# Patient Record
Sex: MALE | NOT HISPANIC OR LATINO | URBAN - METROPOLITAN AREA
[De-identification: names, ages, dates, MRNs, and addresses within clinical notes are randomized per-mention and may not be internally consistent; named-entity substitution may affect disease eponyms.]

---

## 2021-08-23 ENCOUNTER — HOSPITAL ENCOUNTER (EMERGENCY)
Facility: CLINIC | Age: 36
Discharge: HOME | End: 2021-08-23
Attending: EMERGENCY MEDICINE | Admitting: EMERGENCY MEDICINE

## 2021-08-23 VITALS
OXYGEN SATURATION: 99 % | DIASTOLIC BLOOD PRESSURE: 91 MMHG | HEIGHT: 60 IN | BODY MASS INDEX: 29.45 KG/M2 | HEART RATE: 80 BPM | WEIGHT: 150 LBS | RESPIRATION RATE: 16 BRPM | SYSTOLIC BLOOD PRESSURE: 126 MMHG | TEMPERATURE: 98.6 F

## 2021-08-23 DIAGNOSIS — F10.121 ALCOHOL INTOXICATION DELIRIUM (H): ICD-10-CM

## 2021-08-23 DIAGNOSIS — R41.82 ALTERED MENTAL STATUS, UNSPECIFIED ALTERED MENTAL STATUS TYPE: ICD-10-CM

## 2021-08-23 PROCEDURE — 99283 EMERGENCY DEPT VISIT LOW MDM: CPT | Performed by: EMERGENCY MEDICINE

## 2021-08-23 PROCEDURE — 99282 EMERGENCY DEPT VISIT SF MDM: CPT | Performed by: EMERGENCY MEDICINE

## 2021-08-23 ASSESSMENT — MIFFLIN-ST. JEOR: SCORE: 1017.9

## 2021-08-23 NOTE — ED PROVIDER NOTES
"ED Provider Note  [facility]      History     Chief Complaint   Patient presents with     Alcohol Intoxication     HPI  Samantha Rogers is a patient brought to the emergency department by [facility] police.  He was found outside intoxicated and blew a 0.35.  There are no reports of trauma.  Patient reports that he has been drinking a few shots today and has no complaints.  He states he wants to go home but is unable to find a sober  to take him home.    Past Medical History  History reviewed. No pertinent past medical history.  History reviewed. No pertinent surgical history.  No current outpatient medications on file.    No Known Allergies  Past medical history, past surgical history, medications, and allergies were reviewed with the patient. Additional pertinent items: None    Family History  History reviewed. No pertinent family history.  Family history was reviewed with the patient. Additional pertinent items: None    Social History  Social History     Tobacco Use     Smoking status: Never Smoker     Smokeless tobacco: Never Used   Substance Use Topics     Alcohol use: Yes     Drug use: Not Currently      Social history was reviewed with the patient. Additional pertinent items: None      Review of Systems  A complete review of systems was performed with pertinent positives and negatives noted in the HPI, and all other systems negative.    Physical Exam   BP: (!) 126/91  Pulse: 84  Temp: 98.6  F (37  C)  Resp: 14  Height: 150 cm (4' 11.06\")  Weight: 68 kg (150 lb)  SpO2: 99 %  Physical Exam  Vitals and nursing note reviewed.   Constitutional:       General: He is not in acute distress.     Appearance: He is well-developed. He is not ill-appearing or toxic-appearing.      Comments: Patient is awake and alert, gait is ataxic.  Speech is clear, he is maintaining an airway without difficulty.  No obvious signs of trauma noted.   HENT:      Head: Normocephalic and " atraumatic.   Eyes:      General: No scleral icterus.     Pupils: Pupils are equal, round, and reactive to light.   Cardiovascular:      Rate and Rhythm: Normal rate.   Pulmonary:      Effort: Pulmonary effort is normal. No respiratory distress.   Musculoskeletal:      Cervical back: Normal range of motion and neck supple.   Skin:     General: Skin is warm and dry.      Coloration: Skin is not pale.      Findings: No rash.   Neurological:      Mental Status: He is alert and oriented to person, place, and time.      Sensory: No sensory deficit.   Psychiatric:         Behavior: Behavior normal.         ED Course      Procedures          Assessments & Plan (with Medical Decision Making)     This patient presented to the emergency department intoxicated.  He is maintaining an airway without difficulty.  No signs of trauma noted.  Plan will be to observe the patient to clinical sobriety as he does not have a sober  to come pick him up and then discharge as patient does not want to be in the emergency department.    I have reviewed the nursing notes. I have reviewed the findings, diagnosis, plan and need for follow up with the patient.    There are no discharge medications for this patient.      Final diagnoses:   Altered mental status, unspecified altered mental status type   Alcohol intoxication delirium (H)       --  Jose D Dyer  [facility] EMERGENCY DEPARTMENT  8/23/2021     Jose D Dyer MD  08/25/21 1362

## 2021-08-23 NOTE — ED TRIAGE NOTES
Patient brought into ER by [employer]. Patient was found outside hospital wondering around. Breathalyzer for [employer] 0.35. Was waiting for sober ride with  but none came. Patient is spanishing speaking.

## 2021-08-23 NOTE — DISCHARGE INSTRUCTIONS
Please use the below resources and your primary care physician to safely cease alcohol and/or substance use.     Return to the ED if you are having any urgent/life-threatening concerns.     DISCHARGE RESOURCES:  -[facility]: [phone] (detox), [phone] (outpatient & [facility])  -[facility] ([address]): [phone]  -[facility] Detox: [phone]  -[facility] [facility]: [phone]  -SMART Recovery - self management for addiction recovery:  www.smartrecovery.org    -Path[facility] ~ A Health Crisis Resource & Support Center: 678.871.2513.  -[facility] [phone]   -Substance Abuse and Mental Health Services (www.samhsa.gov)  -Harm Reduction Coalition (www. Harmreduction.org)  -[place] Opioid Prevention Coalition: www.opioidcoalition.org  -Poison control 1-708.964.2507       Sober Support Group Information:  AA/NA & Sponsor/Support  -Alcoholics Anonymous (www.alcoholics-anonymous.org): for local information 24 hours/day  -AA Intergroup service office in [place] (http://www.aastpaul.org/) [phone]  -AA Intergroup service office in [facility]: [phone]. (http://www.[place].org/)  -Narcotics Anonymous (www.nam[place].org) (442) [phone]   -Sober Fun Activities: www.sober-activities.db4objects.VoxFeed/[facility]//[facility] Recovery Connection ([facility])  [facility] connects people seeking recovery to resources that help foster and sustain long-term recovery.  Whether you are seeking resources for treatment, transportation, housing, job training, education, health care or other pathways to recovery, [facility] is a great place to start.   Phone: [phone]. www.[place]Heatmaps.[place] (Great listing of all types of recovery and non-recovery related resources)

## 2021-08-23 NOTE — ED NOTES
"     Emergency Department Patient Sign-out       Brief HPI and ED course:  Patient is a 89 year old male signed out to me by Dr. Dyer.  See initial ED Provider note for details of the presentation. In brief, patient found altered on campus. Breath EtOH 0.35 by [place] police.     Vitals:   Patient Vitals for the past 24 hrs:   BP Temp Temp src Pulse Resp SpO2 Height Weight   08/23/21 0016 -- -- -- -- -- -- 1.5 m (4' 11.06\") 68 kg (150 lb)   08/23/21 0015 (!) 126/91 98.6  F (37  C) Oral 84 14 99 % -- --       Received Sign-out Plan:    Pending studies include: none       Plan:   -monitor for mental status clearing, likely discharge when sober    Events after assuming care:  After care was assumed, a focused history and physical was performed. Agree with findings relayed by previous provider.     Patient arriving with altered mental status, with reason to suspect alcohol or other drug intoxication as etiology. Exam without findings suggestive of trauma, non-focal. Breath EtOH 0.35 with police prior to arrival. Nursing notes reviewed.     AMS likely due to intoxication delirium but cannot immediately rule out other dangerous etiologies of AMS. Plan close clinical monitoring of the patient and his mental status for clearing of intoxicating substance. With approriate clearing, the patient would likely be able to be discharged. If not appropriately clearing, plan broadening of work-up, potentially including CT head and serum labs.     During my care, the patient did not require medications for agitation, and did not require restraints/seclusion for patient and/or provider safety.     With monitoring, patient's mental status cleared. Patient then clinically sober with steady gait and tolerating PO. Normalization of mental status with sobering makes other causes of altered mental status very unlikely. After counseling on the diagnosis, work-up, and treatment plan, the patient was discharged. Recommended safe " cessation of intoxicating substances and provided information on community treatment resources. Patient to follow-up with primary care in the coming days for recheck and further cessation counseling. Patient to return to the ED if any urgent/life-threatening concerns.     Final diagnoses:   Altered mental status, unspecified altered mental status type   Alcohol intoxication delirium (H)     New Prescriptions    No medications on file       --  Chace Broderick MD   Emergency Medicine   formerly [facility] EMERGENCY DEPARTMENT  8/23/2021         Chace Broderick MD  08/23/21 0557

## 2022-04-28 ENCOUNTER — HOSPITAL ENCOUNTER (OUTPATIENT)
Facility: CLINIC | Age: 37
Setting detail: OBSERVATION
Discharge: HOME | End: 2022-04-29
Attending: EMERGENCY MEDICINE | Admitting: EMERGENCY MEDICINE

## 2022-04-28 DIAGNOSIS — F10.121 ALCOHOL ABUSE WITH INTOXICATION DELIRIUM (H): ICD-10-CM

## 2022-04-28 DIAGNOSIS — R41.82 ALTERED MENTAL STATUS, UNSPECIFIED ALTERED MENTAL STATUS TYPE: ICD-10-CM

## 2022-04-28 PROCEDURE — 99284 EMERGENCY DEPT VISIT MOD MDM: CPT | Mod: 25 | Performed by: EMERGENCY MEDICINE

## 2022-04-28 PROCEDURE — 82962 GLUCOSE BLOOD TEST: CPT

## 2022-04-28 PROCEDURE — 99219 PR INITIAL OBSERVATION CARE,LEVEL II: CPT | Performed by: EMERGENCY MEDICINE

## 2022-04-29 VITALS
RESPIRATION RATE: 16 BRPM | HEART RATE: 90 BPM | OXYGEN SATURATION: 99 % | TEMPERATURE: 97.6 F | DIASTOLIC BLOOD PRESSURE: 64 MMHG | SYSTOLIC BLOOD PRESSURE: 100 MMHG

## 2022-04-29 LAB
ALBUMIN SERPL-MCNC: 4.4 G/DL (ref 3.4–5)
ALP SERPL-CCNC: 85 U/L (ref 40–150)
ALT SERPL W P-5'-P-CCNC: 56 U/L (ref 0–70)
ANION GAP SERPL CALCULATED.3IONS-SCNC: 7 MMOL/L (ref 3–14)
AST SERPL W P-5'-P-CCNC: 43 U/L (ref 0–45)
BASOPHILS # BLD AUTO: 0.1 10E3/UL (ref 0–0.2)
BASOPHILS NFR BLD AUTO: 1 %
BILIRUB SERPL-MCNC: 0.3 MG/DL (ref 0.2–1.3)
BUN SERPL-MCNC: 7 MG/DL (ref 7–30)
CALCIUM SERPL-MCNC: 8.9 MG/DL (ref 8.5–10.1)
CHLORIDE BLD-SCNC: 114 MMOL/L (ref 94–109)
CO2 SERPL-SCNC: 25 MMOL/L (ref 20–32)
CREAT SERPL-MCNC: 0.65 MG/DL (ref 0.66–1.25)
EOSINOPHIL # BLD AUTO: 0.2 10E3/UL (ref 0–0.7)
EOSINOPHIL NFR BLD AUTO: 3 %
ERYTHROCYTE [DISTWIDTH] IN BLOOD BY AUTOMATED COUNT: 13.1 % (ref 10–15)
ETHANOL SERPL-MCNC: 0.48 G/DL
GFR SERPL CREATININE-BSD FRML MDRD: 73 ML/MIN/1.73M2
GLUCOSE BLD-MCNC: 95 MG/DL (ref 70–99)
GLUCOSE BLDC GLUCOMTR-MCNC: 93 MG/DL (ref 70–99)
HCT VFR BLD AUTO: 47.1 % (ref 40–53)
HGB BLD-MCNC: 15.6 G/DL (ref 13.3–17.7)
IMM GRANULOCYTES # BLD: 0 10E3/UL
IMM GRANULOCYTES NFR BLD: 0 %
LYMPHOCYTES # BLD AUTO: 3.7 10E3/UL (ref 0.8–5.3)
LYMPHOCYTES NFR BLD AUTO: 41 %
MAGNESIUM SERPL-MCNC: 2.7 MG/DL (ref 1.6–2.3)
MCH RBC QN AUTO: 31.3 PG (ref 26.5–33)
MCHC RBC AUTO-ENTMCNC: 33.1 G/DL (ref 31.5–36.5)
MCV RBC AUTO: 95 FL (ref 78–100)
MONOCYTES # BLD AUTO: 0.3 10E3/UL (ref 0–1.3)
MONOCYTES NFR BLD AUTO: 4 %
NEUTROPHILS # BLD AUTO: 4.7 10E3/UL (ref 1.6–8.3)
NEUTROPHILS NFR BLD AUTO: 51 %
NRBC # BLD AUTO: 0 10E3/UL
NRBC BLD AUTO-RTO: 0 /100
PLATELET # BLD AUTO: 308 10E3/UL (ref 150–450)
POTASSIUM BLD-SCNC: 3.8 MMOL/L (ref 3.4–5.3)
PROT SERPL-MCNC: 8.7 G/DL (ref 6.8–8.8)
RBC # BLD AUTO: 4.98 10E6/UL (ref 4.4–5.9)
SODIUM SERPL-SCNC: 146 MMOL/L (ref 133–144)
WBC # BLD AUTO: 9 10E3/UL (ref 4–11)

## 2022-04-29 PROCEDURE — 83735 ASSAY OF MAGNESIUM: CPT | Performed by: EMERGENCY MEDICINE

## 2022-04-29 PROCEDURE — 80053 COMPREHEN METABOLIC PANEL: CPT | Performed by: EMERGENCY MEDICINE

## 2022-04-29 PROCEDURE — 36415 COLL VENOUS BLD VENIPUNCTURE: CPT | Performed by: EMERGENCY MEDICINE

## 2022-04-29 PROCEDURE — 85025 COMPLETE CBC W/AUTO DIFF WBC: CPT | Performed by: EMERGENCY MEDICINE

## 2022-04-29 PROCEDURE — G0378 HOSPITAL OBSERVATION PER HR: HCPCS

## 2022-04-29 PROCEDURE — 99217 PR OBSERVATION CARE DISCHARGE: CPT | Performed by: EMERGENCY MEDICINE

## 2022-04-29 PROCEDURE — 258N000003 HC RX IP 258 OP 636: Performed by: EMERGENCY MEDICINE

## 2022-04-29 PROCEDURE — 82077 ASSAY SPEC XCP UR&BREATH IA: CPT | Performed by: EMERGENCY MEDICINE

## 2022-04-29 RX ADMIN — SODIUM CHLORIDE 1000 ML: 9 INJECTION, SOLUTION INTRAVENOUS at 00:18

## 2022-04-29 NOTE — ED NOTES
Unable to finish triage, pt somnolent. Opening eyes briefly and moving head at times with repeated stimulations.

## 2022-04-29 NOTE — ED PROVIDER NOTES
ED Observation History and Physical  [facility]  Observation Initiation Date: Apr 28, 2022    Samantha Ross MRN: [id number]   Age: 89 year old YOB: 1899     History     Chief Complaint   Patient presents with     Alcohol Intoxication     Bystander called, EMS picked him from [place] and [address] [place] [address] walk.     HPI  Samantha Ross is a 89 year old male with unknown PMH  who presented to the ED with altered mental status. History limited due to altered mental status. There is reason to suspect alcohol and/or drug intoxication as the etiology of altered mental status.  Patient found down on the sidewalk at [place] and [address] [place] [address].  Bystander called EMS.    Past medical history unable to be obtained due to altered mental status.      Review of Systems  A complete review of systems was attempted but limited due to altered mental status.    Physical Exam   BP: 105/61  Pulse: 83  Temp: (!) 95.1  F (35.1  C)  Resp: 16  SpO2: 99 %    Physical Exam  General: Patient smells of EtOH, no acute distress  HENT: MMM. Atraumatic head.   Eyes: PERRL, normal sclerae, nystagmus present  Neck: non-tender, supple  Cardio: Regular rate. Regular rhythm.   Resp: Normal work of breathing, normal respiratory rate  Abdomen: no tenderness, non-distended, no rebound, no guarding  Neuro: alert, slow to respond.  Opens eyes briefly.  Does not verbalize.. Confused. CN II-XII grossly intact. Grossly normal strength and sensation.  Moves all extremities symmetrically.  Integumentary/Skin: no rash visualized, normal color    ED Course      Procedures         Results for orders placed or performed during the hospital encounter of 04/28/22   Comprehensive metabolic panel     Status: Abnormal   Result Value Ref Range    Sodium 146 (H) 133 - 144 mmol/L    Potassium 3.8 3.4 - 5.3 mmol/L    Chloride 114 (H) 94 - 109 mmol/L    Carbon Dioxide (CO2) 25 20 - 32 mmol/L    Anion Gap 7 3 - 14 mmol/L     Urea Nitrogen 7 7 - 30 mg/dL    Creatinine 0.65 (L) 0.66 - 1.25 mg/dL    Calcium 8.9 8.5 - 10.1 mg/dL    Glucose 95 70 - 99 mg/dL    Alkaline Phosphatase 85 40 - 150 U/L    AST 43 0 - 45 U/L    ALT 56 0 - 70 U/L    Protein Total 8.7 6.8 - 8.8 g/dL    Albumin 4.4 3.4 - 5.0 g/dL    Bilirubin Total 0.3 0.2 - 1.3 mg/dL    GFR Estimate 73 >60 mL/min/1.73m2   Magnesium     Status: Abnormal   Result Value Ref Range    Magnesium 2.7 (H) 1.6 - 2.3 mg/dL   Ethyl Alcohol Level     Status: Abnormal   Result Value Ref Range    Alcohol ethyl 0.48 (HH) <=0.01 g/dL   Glucose by meter     Status: Normal   Result Value Ref Range    GLUCOSE BY METER POCT 93 70 - 99 mg/dL   CBC with platelets and differential     Status: None   Result Value Ref Range    WBC Count 9.0 4.0 - 11.0 10e3/uL    RBC Count 4.98 4.40 - 5.90 10e6/uL    Hemoglobin 15.6 13.3 - 17.7 g/dL    Hematocrit 47.1 40.0 - 53.0 %    MCV 95 78 - 100 fL    MCH 31.3 26.5 - 33.0 pg    MCHC 33.1 31.5 - 36.5 g/dL    RDW 13.1 10.0 - 15.0 %    Platelet Count 308 150 - 450 10e3/uL    % Neutrophils 51 %    % Lymphocytes 41 %    % Monocytes 4 %    % Eosinophils 3 %    % Basophils 1 %    % Immature Granulocytes 0 %    NRBCs per 100 WBC 0 <1 /100    Absolute Neutrophils 4.7 1.6 - 8.3 10e3/uL    Absolute Lymphocytes 3.7 0.8 - 5.3 10e3/uL    Absolute Monocytes 0.3 0.0 - 1.3 10e3/uL    Absolute Eosinophils 0.2 0.0 - 0.7 10e3/uL    Absolute Basophils 0.1 0.0 - 0.2 10e3/uL    Absolute Immature Granulocytes 0.0 <=0.4 10e3/uL    Absolute NRBCs 0.0 10e3/uL   CBC with platelets differential     Status: None    Narrative    The following orders were created for panel order CBC with platelets differential.  Procedure                               Abnormality         Status                     ---------                               -----------         ------                     CBC with platelets and d...[[id number]]                      Final result                 Please view results for these  tests on the individual orders.        12:48 AM patient reassessed.  He opens his eyes to voice.  He is moving all of his extremities spontaneously and symmetrically         Labs Ordered and Resulted from Time of ED Arrival to Time of ED Departure   COMPREHENSIVE METABOLIC PANEL - Abnormal       Result Value    Sodium 146 (*)     Potassium 3.8      Chloride 114 (*)     Carbon Dioxide (CO2) 25      Anion Gap 7      Urea Nitrogen 7      Creatinine 0.65 (*)     Calcium 8.9      Glucose 95      Alkaline Phosphatase 85      AST 43      ALT 56      Protein Total 8.7      Albumin 4.4      Bilirubin Total 0.3      GFR Estimate 73     MAGNESIUM - Abnormal    Magnesium 2.7 (*)    ETHYL ALCOHOL LEVEL - Abnormal    Alcohol ethyl 0.48 (*)    GLUCOSE BY METER - Normal    GLUCOSE BY METER POCT 93     GLUCOSE MONITOR NURSING POCT   CBC WITH PLATELETS AND DIFFERENTIAL    WBC Count 9.0      RBC Count 4.98      Hemoglobin 15.6      Hematocrit 47.1      MCV 95      MCH 31.3      MCHC 33.1      RDW 13.1      Platelet Count 308      % Neutrophils 51      % Lymphocytes 41      % Monocytes 4      % Eosinophils 3      % Basophils 1      % Immature Granulocytes 0      NRBCs per 100 WBC 0      Absolute Neutrophils 4.7      Absolute Lymphocytes 3.7      Absolute Monocytes 0.3      Absolute Eosinophils 0.2      Absolute Basophils 0.1      Absolute Immature Granulocytes 0.0      Absolute NRBCs 0.0              Assessments & Plan (with Medical Decision Making)   Patient arriving with altered mental status with reason to suspect alcohol or other drug intoxication as etiology. Nursing notes reviewed. Exam without findings suggestive of trauma, non-focal. Serum EtOH 0.48. Glucose 93.     AMS likely due to intoxication delirium but cannot immediately rule out other dangerous etiologies of AMS. Plan close clinical monitoring of the patient and his mental status for clearing of intoxicating substance. With approriate clearing, the patient would likely  be able to be discharged. If not appropriately clearing, plan broadening of work-up, potentially including CT head and serum labs.     During my care, the patient did not require medications for agitation, and did not require restraints/seclusion for patient and/or provider safety.     With period of monitoring, the patient continues to clear appropriately but is not yet clinically sober at this time. Patient signed out to oncoming provider with plan for further monitoring, likely discharge if appropriately clear with sobriety, further work-up if indicated.     Preliminary diagnosis:  Altered mental status, unspecified     --  BA GARCIA MD, MD   [facility] EMERGENCY DEPARTMENT  4/28/2022      Ba Garcia MD  04/29/22 0049       Ba Garcia MD  04/29/22 0103

## 2022-04-29 NOTE — DISCHARGE INSTRUCTIONS
Please use the below resources and your primary care physician to safely cease alcohol and/or substance use.     Return to the ED if you are having any urgent/life-threatening concerns.     DISCHARGE RESOURCES:  -[facility]: [phone] (detox), [phone] (outpatient & [facility])  -[facility] ([address]): [phone]  -[facility]: [phone]  -[place] [facility]: [phone]  -SMART Recovery - self management for addiction recovery:  www.smartrecovery.org    -Path[facility] ~ A Health Crisis Resource & Support Center: 336.889.3978.  -[facility] [phone]   -Substance Abuse and Mental Health Services (www.samhsa.gov)  -Harm Reduction Coalition (www. Harmreduction.org)  -[place] Opioid Prevention Coalition: www.opioidcoalition.org  -Poison control 1-808.112.6523       Sober Support Group Information:  AA/NA & Sponsor/Support  -Alcoholics Anonymous (www.alcoholics-anonymous.org): for local information 24 hours/day  -AA Intergroup service office in [place] (http://www.aastpaul.org/) [phone]  -AA Intergroup service office in [facility]: [phone]. (http://www.a[place].org/)  -Narcotics Anonymous (www.nam[place].org) (547) 744-1012   -Sober Fun Activities: www.sober-activities.[employer].Run2Sport/[facility]//[facility] Recovery Connection ([facility])  [facility] connects people seeking recovery to resources that help foster and sustain long-term recovery.  Whether you are seeking resources for treatment, transportation, housing, job training, education, health care or other pathways to recovery, [facility] is a great place to start.   Phone: [phone]. www.[place]ua.[employer] (Great listing of all types of recovery and non-recovery related resources)

## 2022-04-29 NOTE — ED PROVIDER NOTES
ED Observation Discharge Summary  [facility]  Discharge Date: 4/29/2022    Samantha Ross MRN: [id number]   Age: 89 year old YOB: 1899     Brief HPI & Initial ED Course     Chief Complaint   Patient presents with     Alcohol Intoxication     Bystander called, EMS picked him from [place] [address].     HPI  Samantha Ross (later identified as Júnior Romano) is a 36 year old male who presented to the ED with altered mental status. Initial history was limited due to altered mental status. The patient arrived with altered mental status with reason to suspect alcohol or other drug intoxication as etiology, and an exam that was without findings suggestive of trauma.     Upon being evaluated in the emergency department, the patient was found to have a condition that would benefit from ongoing monitoring. Observation care was initiated with plan for close clinical monitoring of the patient and his mental status for clearing of intoxicating substance, and broadening of the work-up if not clearing appropriately or if other indications develop.     See ED Observation H&P for further details on the patient's presenting history and initial evaluation.     Physical Exam   BP: 105/61  Pulse: 83  Temp: (!) 95.1  F (35.1  C)  Resp: 16  SpO2: 99 %    Physical Exam  General: no acute distress. Alert.   HENT: MMM. Atraumatic head.   Eyes: PERRL, normal sclerae   Neck: non-tender, supple  Cardio: Regular rate. Regular rhythm.   Resp: Normal work of breathing, normal respiratory rate  Abdomen: no tenderness, non-distended, no rebound, no guarding  Neuro: alert, fully oriented. Steady gait. CN II-XII grossly intact. Grossly normal strength and sensation.   Integumentary/Skin: no rash visualized, normal color    Results      Procedures            Labs Ordered and Resulted from Time of ED Arrival to Time of ED Departure   COMPREHENSIVE METABOLIC PANEL - Abnormal        Result Value    Sodium 146 (*)     Potassium 3.8      Chloride 114 (*)     Carbon Dioxide (CO2) 25      Anion Gap 7      Urea Nitrogen 7      Creatinine 0.65 (*)     Calcium 8.9      Glucose 95      Alkaline Phosphatase 85      AST 43      ALT 56      Protein Total 8.7      Albumin 4.4      Bilirubin Total 0.3      GFR Estimate 73     MAGNESIUM - Abnormal    Magnesium 2.7 (*)    ETHYL ALCOHOL LEVEL - Abnormal    Alcohol ethyl 0.48 (*)    GLUCOSE BY METER - Normal    GLUCOSE BY METER POCT 93     GLUCOSE MONITOR NURSING POCT   CBC WITH PLATELETS AND DIFFERENTIAL    WBC Count 9.0      RBC Count 4.98      Hemoglobin 15.6      Hematocrit 47.1      MCV 95      MCH 31.3      MCHC 33.1      RDW 13.1      Platelet Count 308      % Neutrophils 51      % Lymphocytes 41      % Monocytes 4      % Eosinophils 3      % Basophils 1      % Immature Granulocytes 0      NRBCs per 100 WBC 0      Absolute Neutrophils 4.7      Absolute Lymphocytes 3.7      Absolute Monocytes 0.3      Absolute Eosinophils 0.2      Absolute Basophils 0.1      Absolute Immature Granulocytes 0.0      Absolute NRBCs 0.0              Observation Course   The patient was admitted to observation status with plan for close clinical monitoring of the patient and his mental status for clearing of intoxicating substance, and broadening of the work-up if not clearing appropriately or if other indications develop.     Serial assessments of the patient's mental status were performed. Nursing notes were reviewed. During the observation period, the patient did not require medications for agitation, and did not require restraints/seclusion for patient and/or provider safety.      With monitoring, patient's mental status cleared. Patient then clinically sober with steady gait and tolerating PO. Normalization of mental status with sobering makes other causes of altered mental status very unlikely.     After counseling on the diagnosis, work-up, and treatment plan, the  patient was discharged. Recommended safe cessation of EtOH/drugs and provided information on community treatment resources. Patient to follow-up with primary care in the coming days for recheck and further cessation counseling. Patient to return to the ED if any urgent or potentially life-threatening concerns.    Discharge Diagnoses:   Final diagnoses:   Altered mental status, unspecified altered mental status type   Alcohol abuse with intoxication delirium (H)       --  Chace Broderick MD  [facility] EMERGENCY DEPARTMENT  4/29/2022             Chace Broderick MD  04/29/22 0711